# Patient Record
Sex: MALE | Race: WHITE | NOT HISPANIC OR LATINO | Employment: UNEMPLOYED | URBAN - METROPOLITAN AREA
[De-identification: names, ages, dates, MRNs, and addresses within clinical notes are randomized per-mention and may not be internally consistent; named-entity substitution may affect disease eponyms.]

---

## 2023-08-08 ENCOUNTER — HOSPITAL ENCOUNTER (EMERGENCY)
Facility: HOSPITAL | Age: 23
Discharge: HOME | End: 2023-08-09
Attending: STUDENT IN AN ORGANIZED HEALTH CARE EDUCATION/TRAINING PROGRAM
Payer: COMMERCIAL

## 2023-08-08 VITALS
WEIGHT: 175 LBS | BODY MASS INDEX: 23.7 KG/M2 | HEIGHT: 72 IN | OXYGEN SATURATION: 97 % | TEMPERATURE: 98.4 F | HEART RATE: 80 BPM | DIASTOLIC BLOOD PRESSURE: 74 MMHG | RESPIRATION RATE: 10 BRPM | SYSTOLIC BLOOD PRESSURE: 117 MMHG

## 2023-08-08 DIAGNOSIS — Z13.9 ENCOUNTER FOR MEDICAL SCREENING EXAMINATION: Primary | ICD-10-CM

## 2023-08-08 PROCEDURE — 99283 EMERGENCY DEPT VISIT LOW MDM: CPT

## 2023-08-09 NOTE — DISCHARGE INSTRUCTIONS
Symptomatic care is recommended with Tylenol or ibuprofen as needed for pain. Take all medications as prescribed and instructed. Follow up with primary care as directed or return to Emergency Department with worsening of symptoms.

## 2023-08-16 NOTE — ED PROVIDER NOTES
EMERGENCY DEPARTMENT ENCOUNTER    Pt Name: Neville Oviedo  MRN: [id number]  Pt :   2000  Room Number:  ROSS/ROSS  Date of encounter:  2023  PCP: Isaiah Rousseau DO  ED Provider: CATERINA Rosenberg    Historian: Patient    HPI:  Chief Complaint:    Context: Neville Oviedo is a 23 y.o. male who presents to the ED after being found alone at [facility]. Patient is blind and deaf. Vital signs stable on exam. Patient unable to provide additional on interview and exam.   HPI     REVIEW OF SYSTEMS  A chief complaint appropriate review of systems was completed and is negative except as noted in the HPI.     PAST MEDICAL HISTORY  No past medical history on file.    PAST SURGICAL HISTORY  No past surgical history on file.    FAMILY HISTORY  No family history on file.    SOCIAL HISTORY  Social History     Socioeconomic History    Marital status: Single       ALLERGIES  Patient has no known allergies.    PHYSICAL EXAM  Physical Exam  GENERAL:   Appears in no acute distress.   HENT: Nares patent.  EYES: No scleral icterus.   CV: Regular rhythm, regular rate.  RESPIRATORY: Normal effort.  No audible wheezes, rales or rhonchi.  ABDOMEN: Soft, nontender  MUSCULOSKELETAL: No deformities.   SKIN: Warm, dry, no rash visualized.    I have reviewed the triage vital signs and nursing notes.     LAB RESULTS  No results found for this or any previous visit.    If labs were ordered, I independently reviewed the results and considered them in treating the patient.    RADIOLOGY  No orders to display     [] Radiologist's Report Reviewed:  I ordered and independently reviewed the above noted radiographic studies.  See radiologist's dictation for official interpretation.      PROCEDURES    Procedures    No orders to display       MEDICATIONS GIVEN IN ER    Medications - No data to display    MEDICAL DECISION MAKING, PROGRESS, and CONSULTS   Medical Decision Making  Problems Addressed:  Encounter for medical screening examination:  acute illness or injury        All labs have been independently reviewed by me.  All radiology studies have been reviewed by me and the radiologist dictating the report.  All EKG's have been independently viewed by me.    [] Discussed with radiology regarding test interpretation:    Discussion below represents my analysis of pertinent findings related to patient's condition, differential diagnosis, treatment plan and final disposition.    Differential diagnosis:  The differential diagnosis associated with the patient's presentation includes: Hypoxic encephalopathy, acute toxic-metabolic encephalopathy (delirium), hypertensive encephalopathy, toxins, alcohol withdrawal, drug reaction (NMS, serotonin syndrome), sepsis, UTI, CNS trauma, subdural hematoma, epidural hematoma, subarachnoid hemorrhage, stroke, encephalitis, meningitis, seizures, dementia, acute psychosis, excited delirium and malingering     Additional sources  Discussed/ obtained information from independent historians:   [] Spouse  [] Parent  [x] Family member  [] Friend  [] EMS   [] Other:  External (non-ED) record review:   [] Inpatient record:   [] Office record:   [] Outpatient record:   [] Prior Outpatient labs:   [] Prior Outpatient radiology:   [] Primary Care record:   [] Outside ED record:   [] Other:   Patient's care impacted by:   [] Diabetes  [] Hypertension  [] Hyperlipidemia  [] Hypothyroidism   [] Coronary Artery Disease   [] COPD   [] Cancer   [] Obesity  [] GERD   [] Tobacco Abuse   [] Substance Abuse    [] Anxiety   [] Depression   [] Other:   Care significantly affected by Social Determinants of Health (housing and economic circumstances, unemployment)    [] Yes     [x] No   If yes, Patient's care significantly limited by  Social Determinants of Health including:   [] Inadequate housing   [] Low income   [] Alcoholism and drug addiction in family   [] Problems related to primary support group   [] Unemployment   [] Problems related to  employment   [] Other Social Determinants of Health:     Shared decision making:  I had a discussion with the patient/family regarding diagnosis, diagnostic results, treatment plan, and medications.  The patient/family indicated understanding of these instructions.  I spent adequate time at the bedside preceding discharge necessary to personally discuss the aftercare instructions, giving patient education, providing explanations of the results of our evaluations/findings, and my decision making to assure that the patient/family understand the plan of care.  Time was allotted to answer questions at that time and throughout the ED course.  Emphasis was placed on timely follow-up after discharge.  I also discussed the potential for the development of an acute emergent condition requiring further evaluation, admission, or even surgical intervention. I discussed that we found nothing during the visit today indicating the need for further workup, admission, or the presence of an unstable medical condition.  I encouraged the patient to return to the emergency department immediately for ANY concerns, worsening, new complaints, or if symptoms persist and unable to seek follow-up in a timely fashion.  The patient/family expressed understanding and agreement with this plan.  The patient will follow-up with primary care for reevaluation.      Orders placed during this visit:  No orders of the defined types were placed in this encounter.    ED Course:    ED Course as of 08/16/23 1321   Wed Aug 09, 2023   0008 Patient presentation reviewed with mother who is now present at bedside. Mother reports that patient has had a lot of stressors lately. He is able to answer questions appropriately. He denies any specific pain or complaints. He wishes to go home. Mother is a safe disposition plan.  [JG]   0010 In summary this is a 23-year-old male who is brought in by EMS after being found laying on the ground at nearby shopping facility.   Per mother who is present at bedside on subsequent examination patient has many stressors recently and apparently left his house.  Patient is legally blind and deaf and became tired and disoriented and laid down on the ground.  Mother shares that he has done this before as result of stress.  She states that she is his primary caregiver and will be responsible for him and he has a safe place to stay and disposition plan for continued outpatient follow-up and treatment of his behavioral health concerns.At time of discharge disposition patient is afebrile, nontoxic appearing, vital signs stable and able to maintain O2 sats of 97% on room air. Patient will be discharged home with symptomatic care and outpatient follow up.  [JG]      ED Course User Index  [JG] Quinton Correia PA            DIAGNOSIS  Final diagnoses:   Encounter for medical screening examination       DISPOSITION    ED Disposition       ED Disposition   Discharge    Condition   Stable    Comment   --               Please note that portions of this document were completed with voice recognition software.        Quinton Correia PA  08/16/23 8324